# Patient Record
Sex: FEMALE | Race: WHITE | Employment: OTHER | URBAN - NONMETROPOLITAN AREA
[De-identification: names, ages, dates, MRNs, and addresses within clinical notes are randomized per-mention and may not be internally consistent; named-entity substitution may affect disease eponyms.]

---

## 2017-04-02 DIAGNOSIS — J44.9 CHRONIC OBSTRUCTIVE PULMONARY DISEASE, UNSPECIFIED COPD TYPE (HCC): ICD-10-CM

## 2017-08-07 ENCOUNTER — TELEPHONE (OUTPATIENT)
Dept: PRIMARY CARE CLINIC | Age: 82
End: 2017-08-07

## 2017-09-07 RX ORDER — DIGOXIN 125 MCG
125 TABLET ORAL DAILY
Qty: 90 TABLET | Refills: 3 | Status: SHIPPED | OUTPATIENT
Start: 2017-09-07 | End: 2017-11-13 | Stop reason: SDUPTHER

## 2017-11-13 ENCOUNTER — OFFICE VISIT (OUTPATIENT)
Dept: PRIMARY CARE CLINIC | Age: 82
End: 2017-11-13
Payer: MEDICARE

## 2017-11-13 VITALS
WEIGHT: 97 LBS | HEART RATE: 68 BPM | OXYGEN SATURATION: 95 % | BODY MASS INDEX: 16.56 KG/M2 | TEMPERATURE: 98.4 F | SYSTOLIC BLOOD PRESSURE: 98 MMHG | HEIGHT: 64 IN | DIASTOLIC BLOOD PRESSURE: 74 MMHG

## 2017-11-13 DIAGNOSIS — I48.91 ATRIAL FIBRILLATION, UNSPECIFIED TYPE (HCC): Primary | ICD-10-CM

## 2017-11-13 DIAGNOSIS — F03.90 DEMENTIA WITHOUT BEHAVIORAL DISTURBANCE, UNSPECIFIED DEMENTIA TYPE: ICD-10-CM

## 2017-11-13 DIAGNOSIS — E78.2 MIXED HYPERLIPIDEMIA: ICD-10-CM

## 2017-11-13 DIAGNOSIS — I50.9 CHRONIC CONGESTIVE HEART FAILURE, UNSPECIFIED CONGESTIVE HEART FAILURE TYPE: ICD-10-CM

## 2017-11-13 DIAGNOSIS — Z23 NEED FOR INFLUENZA VACCINATION: ICD-10-CM

## 2017-11-13 PROCEDURE — G8484 FLU IMMUNIZE NO ADMIN: HCPCS | Performed by: NURSE PRACTITIONER

## 2017-11-13 PROCEDURE — 4040F PNEUMOC VAC/ADMIN/RCVD: CPT | Performed by: NURSE PRACTITIONER

## 2017-11-13 PROCEDURE — 90662 IIV NO PRSV INCREASED AG IM: CPT | Performed by: NURSE PRACTITIONER

## 2017-11-13 PROCEDURE — G8427 DOCREV CUR MEDS BY ELIG CLIN: HCPCS | Performed by: NURSE PRACTITIONER

## 2017-11-13 PROCEDURE — 1036F TOBACCO NON-USER: CPT | Performed by: NURSE PRACTITIONER

## 2017-11-13 PROCEDURE — 1123F ACP DISCUSS/DSCN MKR DOCD: CPT | Performed by: NURSE PRACTITIONER

## 2017-11-13 PROCEDURE — G8599 NO ASA/ANTIPLAT THER USE RNG: HCPCS | Performed by: NURSE PRACTITIONER

## 2017-11-13 PROCEDURE — G0008 ADMIN INFLUENZA VIRUS VAC: HCPCS | Performed by: NURSE PRACTITIONER

## 2017-11-13 PROCEDURE — 1090F PRES/ABSN URINE INCON ASSESS: CPT | Performed by: NURSE PRACTITIONER

## 2017-11-13 PROCEDURE — G8419 CALC BMI OUT NRM PARAM NOF/U: HCPCS | Performed by: NURSE PRACTITIONER

## 2017-11-13 PROCEDURE — 99214 OFFICE O/P EST MOD 30 MIN: CPT | Performed by: NURSE PRACTITIONER

## 2017-11-13 RX ORDER — DIGOXIN 125 MCG
125 TABLET ORAL DAILY
Qty: 90 TABLET | Refills: 3 | Status: SHIPPED | OUTPATIENT
Start: 2017-11-13 | End: 2018-01-01

## 2017-11-13 ASSESSMENT — ENCOUNTER SYMPTOMS
RHINORRHEA: 0
ABDOMINAL PAIN: 0
COUGH: 0
VOMITING: 0
SORE THROAT: 0
DIARRHEA: 0
SINUS PRESSURE: 0
CONSTIPATION: 0
SHORTNESS OF BREATH: 0
TROUBLE SWALLOWING: 0
NAUSEA: 0

## 2017-11-13 NOTE — PROGRESS NOTES
History:   Diagnosis Date    Allergic rhinitis     Anemia     Anxiety     Arthritis     Asthma     Atrial fibrillation (HCC)     Atrial fibrillation (HCC)     CAD (coronary artery disease)     CHF (congestive heart failure) (HCC)     Chronic back pain     Chronic pain     COPD (chronic obstructive pulmonary disease) (HCC)     COPD (chronic obstructive pulmonary disease) (HCC)     Depression     DJD (degenerative joint disease)     KNEES    Dyspnea on exertion     Fatigue     GERD (gastroesophageal reflux disease)     GI bleed     Hyperlipidemia     Hypertension     Myalgia     Neck pain     Palpitations     Pedal edema     Left ankle    Psoriasis     SOB (shortness of breath)     Tendonitis        Past Surgical History:   Procedure Laterality Date    CHOLECYSTECTOMY      DIAGNOSTIC CARDIAC CATH LAB PROCEDURE  1/25/02       Social History   Substance Use Topics    Smoking status: Former Smoker     Types: Cigarettes     Quit date: 12/17/1989    Smokeless tobacco: Never Used    Alcohol use No       Review of Systems   Constitutional: Negative for activity change, appetite change, fatigue, fever and unexpected weight change. HENT: Negative for congestion, hearing loss, rhinorrhea, sinus pressure, sore throat and trouble swallowing. Eyes: Negative for visual disturbance. Respiratory: Negative for cough and shortness of breath. Cardiovascular: Negative for chest pain, palpitations and leg swelling. Gastrointestinal: Negative for abdominal pain, constipation, diarrhea, nausea and vomiting. Endocrine: Negative for cold intolerance and heat intolerance. Genitourinary: Negative for flank pain, menstrual problem, pelvic pain, urgency and vaginal discharge. Musculoskeletal: Positive for arthralgias (chronic back pain ) and gait problem (uses walker). Skin: Negative for rash. Neurological: Negative for headaches. Psychiatric/Behavioral: Positive for confusion.  Negative for dysphoric mood and sleep disturbance. The patient is not nervous/anxious. Physical Exam   Constitutional: She is oriented to person, place, and time. She appears well-developed and well-nourished. HENT:   Head: Normocephalic and atraumatic. Right Ear: External ear normal.   Left Ear: External ear normal.   Nose: Nose normal.   Mouth/Throat: Oropharynx is clear and moist and mucous membranes are normal.   Heavy cerumen buildup in external ear canal.    Eyes: Conjunctivae are normal. Pupils are equal, round, and reactive to light. No scleral icterus. Neck: Normal range of motion. Neck supple. Cardiovascular: Normal rate, regular rhythm, normal heart sounds and intact distal pulses. No murmur heard. Pulmonary/Chest: Effort normal and breath sounds normal. No respiratory distress. Abdominal: Soft. Bowel sounds are normal. She exhibits no distension. There is no tenderness. Musculoskeletal: Normal range of motion. She exhibits no edema. Uses walker     Neurological: She is alert and oriented to person, place, and time. GCS eye subscore is 4. GCS verbal subscore is 5. GCS motor subscore is 6. Skin: Skin is warm, dry and intact. No lesion and no rash noted. Psychiatric: She has a normal mood and affect. Her speech is normal and behavior is normal. Judgment normal. Cognition and memory are impaired. She exhibits abnormal recent memory. Vitals reviewed. ASSESSMENT      ICD-10-CM ICD-9-CM    1. Atrial fibrillation, unspecified type (HCC) I48.91 427.31 digoxin (LANOXIN) 125 MCG tablet      T4, Free      TSH without Reflex   2. Mixed hyperlipidemia E78.2 272.2 CBC Auto Differential      Comprehensive Metabolic Panel      Lipid Panel   3. Chronic congestive heart failure, unspecified congestive heart failure type (HCC) I50.9 428.0 digoxin (LANOXIN) 125 MCG tablet      Digoxin Level      Internal Referral to Home Health   4.  Need for influenza vaccination Z23 V04.81 INFLUENZA, HIGH DOSE, 65 YRS to complete a self tracking handout on n/a and instructed them to bring it with them to her next appointment. Discussed use, benefit, and side effects of prescribed medications. Barriers to medication compliance addressed. All patient questions answered. Pt voiced understanding.      SANTOS Morillo

## 2017-11-14 ENCOUNTER — TELEPHONE (OUTPATIENT)
Dept: PRIMARY CARE CLINIC | Age: 82
End: 2017-11-14

## 2017-11-14 NOTE — TELEPHONE ENCOUNTER
Iliana with [facility] called, wanting to  Know what agency pt wanted to go to.  I told her [facility], hence the internal referral. Had to fax pts referral, OV, demo, and med list to [phone]

## 2017-11-15 ENCOUNTER — TELEPHONE (OUTPATIENT)
Dept: PRIMARY CARE CLINIC | Age: 82
End: 2017-11-15

## 2017-11-15 NOTE — TELEPHONE ENCOUNTER
Anitha Papito with [address]  went to do admission and pt and spouse was too confused to admit her. They will try again tomorrow after 4 when dtr can be there. They also need a  order as pt is not bathing and other hygiene issues. And they want to try and do PT and OT.

## 2017-11-16 ENCOUNTER — TELEPHONE (OUTPATIENT)
Dept: PRIMARY CARE CLINIC | Age: 82
End: 2017-11-16

## 2017-11-16 NOTE — TELEPHONE ENCOUNTER
Christopher Severino called, she went to see pt today and pts family has wanted to wait on [facility] for right now. They have cancelled it.

## 2017-11-22 ENCOUNTER — TELEPHONE (OUTPATIENT)
Dept: PRIMARY CARE CLINIC | Age: 82
End: 2017-11-22

## 2017-11-23 NOTE — TELEPHONE ENCOUNTER
That I believe, would be arranged outside of our office. I do not know that that is something that we can order, that Medicare or insurance will provide.   If I am incorrect, please let me know

## 2017-11-27 NOTE — TELEPHONE ENCOUNTER
LM earlier for Ruchi Branch with [facility]. Also LM with pts daughter just now to call us back.

## 2018-01-01 ENCOUNTER — TELEPHONE (OUTPATIENT)
Dept: PRIMARY CARE CLINIC | Age: 83
End: 2018-01-01

## 2018-01-01 ENCOUNTER — CARE COORDINATION (OUTPATIENT)
Dept: CARE COORDINATION | Age: 83
End: 2018-01-01

## 2018-01-01 ENCOUNTER — HOSPITAL ENCOUNTER (OUTPATIENT)
Dept: WOUND CARE | Age: 83
Discharge: HOME | End: 2018-05-18
Payer: MEDICARE

## 2018-01-01 ENCOUNTER — APPOINTMENT (OUTPATIENT)
Dept: CT IMAGING | Age: 83
End: 2018-01-01
Payer: MEDICARE

## 2018-01-01 ENCOUNTER — OFFICE VISIT (OUTPATIENT)
Dept: PRIMARY CARE CLINIC | Age: 83
End: 2018-01-01
Payer: MEDICARE

## 2018-01-01 ENCOUNTER — HOSPITAL ENCOUNTER (OUTPATIENT)
Dept: WOUND CARE | Age: 83
Discharge: HOME | End: 2018-06-04

## 2018-01-01 ENCOUNTER — HOSPITAL ENCOUNTER (EMERGENCY)
Age: 83
Discharge: HOME | End: 2018-12-04
Attending: EMERGENCY MEDICINE
Payer: MEDICARE

## 2018-01-01 VITALS
WEIGHT: 98 LBS | OXYGEN SATURATION: 95 % | DIASTOLIC BLOOD PRESSURE: 70 MMHG | SYSTOLIC BLOOD PRESSURE: 100 MMHG | TEMPERATURE: 98.9 F | HEART RATE: 74 BPM | BODY MASS INDEX: 16.73 KG/M2 | HEIGHT: 64 IN

## 2018-01-01 VITALS
BODY MASS INDEX: 30.82 KG/M2 | WEIGHT: 174 LBS | DIASTOLIC BLOOD PRESSURE: 51 MMHG | HEART RATE: 78 BPM | SYSTOLIC BLOOD PRESSURE: 98 MMHG | TEMPERATURE: 98.6 F | OXYGEN SATURATION: 92 %

## 2018-01-01 VITALS
BODY MASS INDEX: 18.28 KG/M2 | RESPIRATION RATE: 20 BRPM | HEART RATE: 66 BPM | DIASTOLIC BLOOD PRESSURE: 74 MMHG | WEIGHT: 103.2 LBS | SYSTOLIC BLOOD PRESSURE: 110 MMHG | TEMPERATURE: 97.8 F | OXYGEN SATURATION: 98 %

## 2018-01-01 VITALS
OXYGEN SATURATION: 96 % | WEIGHT: 150.38 LBS | SYSTOLIC BLOOD PRESSURE: 102 MMHG | TEMPERATURE: 97.9 F | BODY MASS INDEX: 26.64 KG/M2 | HEART RATE: 76 BPM | DIASTOLIC BLOOD PRESSURE: 67 MMHG

## 2018-01-01 VITALS
BODY MASS INDEX: 17.36 KG/M2 | DIASTOLIC BLOOD PRESSURE: 78 MMHG | RESPIRATION RATE: 18 BRPM | HEART RATE: 78 BPM | HEIGHT: 63 IN | TEMPERATURE: 98.2 F | SYSTOLIC BLOOD PRESSURE: 112 MMHG
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
## 2018-01-01 DIAGNOSIS — M79.604 LEG PAIN, ANTERIOR, RIGHT: ICD-10-CM

## 2018-01-01 DIAGNOSIS — F41.9 ANXIETY: ICD-10-CM

## 2018-01-01 DIAGNOSIS — F03.90 DEMENTIA WITHOUT BEHAVIORAL DISTURBANCE, UNSPECIFIED DEMENTIA TYPE: ICD-10-CM

## 2018-01-01 DIAGNOSIS — L03.115 CELLULITIS OF RIGHT LOWER EXTREMITY: ICD-10-CM

## 2018-01-01 DIAGNOSIS — T14.8XXA HEMATOMA: ICD-10-CM

## 2018-01-01 DIAGNOSIS — S81.801A WOUND OF RIGHT LOWER EXTREMITY, INITIAL ENCOUNTER: Primary | ICD-10-CM

## 2018-01-01 DIAGNOSIS — S09.90XA INJURY OF HEAD, INITIAL ENCOUNTER: Primary | ICD-10-CM

## 2018-01-01 DIAGNOSIS — I50.9 CONGESTIVE HEART FAILURE, UNSPECIFIED HF CHRONICITY, UNSPECIFIED HEART FAILURE TYPE (HCC): ICD-10-CM

## 2018-01-01 DIAGNOSIS — L98.499 SKIN ULCER, UNSPECIFIED ULCER STAGE (HCC): Primary | ICD-10-CM

## 2018-01-01 DIAGNOSIS — J44.9 CHRONIC OBSTRUCTIVE PULMONARY DISEASE, UNSPECIFIED COPD TYPE (HCC): ICD-10-CM

## 2018-01-01 DIAGNOSIS — J44.9 CHRONIC OBSTRUCTIVE PULMONARY DISEASE, UNSPECIFIED COPD TYPE (HCC): Primary | ICD-10-CM

## 2018-01-01 DIAGNOSIS — N30.01 ACUTE CYSTITIS WITH HEMATURIA: ICD-10-CM

## 2018-01-01 DIAGNOSIS — R53.83 FATIGUE, UNSPECIFIED TYPE: ICD-10-CM

## 2018-01-01 DIAGNOSIS — F03.91 DEMENTIA WITH BEHAVIORAL DISTURBANCE, UNSPECIFIED DEMENTIA TYPE: Primary | ICD-10-CM

## 2018-01-01 DIAGNOSIS — F02.818 LATE ONSET ALZHEIMER'S DISEASE WITH BEHAVIORAL DISTURBANCE (HCC): Primary | ICD-10-CM

## 2018-01-01 DIAGNOSIS — I48.20 CHRONIC ATRIAL FIBRILLATION (HCC): ICD-10-CM

## 2018-01-01 DIAGNOSIS — L97.912 NON-PRESSURE CHRONIC ULCER OF RIGHT LOWER LEG WITH FAT LAYER EXPOSED (HCC): ICD-10-CM

## 2018-01-01 DIAGNOSIS — I87.2 PERIPHERAL VENOUS INSUFFICIENCY: ICD-10-CM

## 2018-01-01 DIAGNOSIS — Z23 NEED FOR INFLUENZA VACCINATION: ICD-10-CM

## 2018-01-01 DIAGNOSIS — L97.812 SKIN ULCER OF RIGHT PRETIBIAL REGION WITH FAT LAYER EXPOSED (HCC): ICD-10-CM

## 2018-01-01 DIAGNOSIS — M79.89 PAIN AND SWELLING OF RIGHT LOWER LEG: ICD-10-CM

## 2018-01-01 DIAGNOSIS — G30.1 LATE ONSET ALZHEIMER'S DISEASE WITH BEHAVIORAL DISTURBANCE (HCC): Primary | ICD-10-CM

## 2018-01-01 DIAGNOSIS — F03.91 DEMENTIA WITH BEHAVIORAL DISTURBANCE, UNSPECIFIED DEMENTIA TYPE: ICD-10-CM

## 2018-01-01 DIAGNOSIS — L03.115 CELLULITIS OF LEG, RIGHT: ICD-10-CM

## 2018-01-01 DIAGNOSIS — K59.01 SLOW TRANSIT CONSTIPATION: ICD-10-CM

## 2018-01-01 DIAGNOSIS — S81.801A WOUND OF RIGHT LOWER EXTREMITY, INITIAL ENCOUNTER: ICD-10-CM

## 2018-01-01 DIAGNOSIS — F41.1 GAD (GENERALIZED ANXIETY DISORDER): Primary | ICD-10-CM

## 2018-01-01 DIAGNOSIS — M79.661 PAIN AND SWELLING OF RIGHT LOWER LEG: ICD-10-CM

## 2018-01-01 DIAGNOSIS — R06.09 DYSPNEA ON EXERTION: ICD-10-CM

## 2018-01-01 LAB
ALBUMIN SERPL-MCNC: 3.6 G/DL (ref 3.5–5.2)
ALP BLD-CCNC: 105 U/L (ref 35–104)
ALT SERPL-CCNC: 6 U/L (ref 5–33)
ANION GAP SERPL CALCULATED.3IONS-SCNC: 6 MMOL/L (ref 7–19)
AST SERPL-CCNC: 15 U/L (ref 5–32)
BACTERIA: ABNORMAL /HPF
BASOPHILS ABSOLUTE: 0.1 K/UL (ref 0–0.2)
BASOPHILS RELATIVE PERCENT: 1.1 % (ref 0–1)
BILIRUB SERPL-MCNC: 0.5 MG/DL (ref 0.2–1.2)
BILIRUBIN URINE: NEGATIVE
BLOOD, URINE: ABNORMAL
BUN BLDV-MCNC: 20 MG/DL (ref 8–23)
CALCIUM SERPL-MCNC: 9.6 MG/DL (ref 8.8–10.2)
CASTS: ABNORMAL /LPF
CHLORIDE BLD-SCNC: 100 MMOL/L (ref 98–111)
CHOLESTEROL, TOTAL: 210 MG/DL (ref 160–199)
CLARITY: ABNORMAL
CO2: 34 MMOL/L (ref 22–29)
COLOR: ABNORMAL
CREAT SERPL-MCNC: 0.7 MG/DL (ref 0.5–0.9)
DIGOXIN LEVEL: <0.3 NG/ML (ref 0.6–1.2)
EOSINOPHILS ABSOLUTE: 0.1 K/UL (ref 0–0.6)
EOSINOPHILS RELATIVE PERCENT: 3.1 % (ref 0–5)
GFR NON-AFRICAN AMERICAN: >60
GLUCOSE BLD-MCNC: 95 MG/DL (ref 74–109)
GLUCOSE URINE: NEGATIVE MG/DL
GRAM STAIN RESULT: ABNORMAL
HCT VFR BLD CALC: 43.9 % (ref 37–47)
HDLC SERPL-MCNC: 68 MG/DL (ref 65–121)
HEMOGLOBIN: 13.9 G/DL (ref 12–16)
KETONES, URINE: NEGATIVE MG/DL
LDL CHOLESTEROL CALCULATED: 124 MG/DL
LEUKOCYTE ESTERASE, URINE: ABNORMAL
LYMPHOCYTES ABSOLUTE: 1 K/UL (ref 1.1–4.5)
LYMPHOCYTES RELATIVE PERCENT: 21.8 % (ref 20–40)
MCH RBC QN AUTO: 31 PG (ref 27–31)
MCHC RBC AUTO-ENTMCNC: 31.7 G/DL (ref 33–37)
MCV RBC AUTO: 98 FL (ref 81–99)
MONOCYTES ABSOLUTE: 0.5 K/UL (ref 0–0.9)
MONOCYTES RELATIVE PERCENT: 10.2 % (ref 0–10)
MUCUS: ABNORMAL /LPF
NEUTROPHILS ABSOLUTE: 2.8 K/UL (ref 1.5–7.5)
NEUTROPHILS RELATIVE PERCENT: 63.4 % (ref 50–65)
NITRITE, URINE: NEGATIVE
ORGANISM: ABNORMAL
PDW BLD-RTO: 12 % (ref 11.5–14.5)
PH UA: 6
PLATELET # BLD: 271 K/UL (ref 130–400)
PMV BLD AUTO: 9.8 FL (ref 9.4–12.3)
POTASSIUM SERPL-SCNC: 4.5 MMOL/L (ref 3.5–5)
PROTEIN UA: ABNORMAL MG/DL
RBC # BLD: 4.48 M/UL (ref 4.2–5.4)
RBC UA: ABNORMAL /HPF (ref 0–2)
SODIUM BLD-SCNC: 140 MMOL/L (ref 136–145)
SPECIFIC GRAVITY UA: 1.03
T3 FREE: 2.1 PG/ML (ref 2–4.4)
T4 FREE: 1.1 NG/DL (ref 0.9–1.7)
TOTAL PROTEIN: 6.6 G/DL (ref 6.6–8.7)
TRIGL SERPL-MCNC: 92 MG/DL (ref 0–149)
TSH SERPL DL<=0.05 MIU/L-ACNC: 1.9 UIU/ML (ref 0.27–4.2)
URINE CULTURE, ROUTINE: NORMAL
URINE REFLEX TO CULTURE: YES
UROBILINOGEN, URINE: 0.2 E.U./DL
WBC # BLD: 4.5 K/UL (ref 4.8–10.8)
WBC UA: ABNORMAL /HPF (ref 0–5)
WOUND/ABSCESS: ABNORMAL
WOUND/ABSCESS: ABNORMAL

## 2018-01-01 PROCEDURE — 1036F TOBACCO NON-USER: CPT | Performed by: NURSE PRACTITIONER

## 2018-01-01 PROCEDURE — 90662 IIV NO PRSV INCREASED AG IM: CPT | Performed by: NURSE PRACTITIONER

## 2018-01-01 PROCEDURE — 81001 URINALYSIS AUTO W/SCOPE: CPT

## 2018-01-01 PROCEDURE — 1101F PT FALLS ASSESS-DOCD LE1/YR: CPT | Performed by: NURSE PRACTITIONER

## 2018-01-01 PROCEDURE — G8427 DOCREV CUR MEDS BY ELIG CLIN: HCPCS | Performed by: NURSE PRACTITIONER

## 2018-01-01 PROCEDURE — G8599 NO ASA/ANTIPLAT THER USE RNG: HCPCS | Performed by: NURSE PRACTITIONER

## 2018-01-01 PROCEDURE — 3023F SPIROM DOC REV: CPT | Performed by: NURSE PRACTITIONER

## 2018-01-01 PROCEDURE — 99284 EMERGENCY DEPT VISIT MOD MDM: CPT | Performed by: EMERGENCY MEDICINE

## 2018-01-01 PROCEDURE — 99213 OFFICE O/P EST LOW 20 MIN: CPT | Performed by: NURSE PRACTITIONER

## 2018-01-01 PROCEDURE — 70450 CT HEAD/BRAIN W/O DYE: CPT

## 2018-01-01 PROCEDURE — 1123F ACP DISCUSS/DSCN MKR DOCD: CPT | Performed by: NURSE PRACTITIONER

## 2018-01-01 PROCEDURE — G8417 CALC BMI ABV UP PARAM F/U: HCPCS | Performed by: NURSE PRACTITIONER

## 2018-01-01 PROCEDURE — 99214 OFFICE O/P EST MOD 30 MIN: CPT | Performed by: NURSE PRACTITIONER

## 2018-01-01 PROCEDURE — G8926 SPIRO NO PERF OR DOC: HCPCS | Performed by: NURSE PRACTITIONER

## 2018-01-01 PROCEDURE — 1090F PRES/ABSN URINE INCON ASSESS: CPT | Performed by: NURSE PRACTITIONER

## 2018-01-01 PROCEDURE — G8419 CALC BMI OUT NRM PARAM NOF/U: HCPCS | Performed by: NURSE PRACTITIONER

## 2018-01-01 PROCEDURE — 72128 CT CHEST SPINE W/O DYE: CPT

## 2018-01-01 PROCEDURE — 99214 OFFICE O/P EST MOD 30 MIN: CPT

## 2018-01-01 PROCEDURE — 6370000000 HC RX 637 (ALT 250 FOR IP): Performed by: EMERGENCY MEDICINE

## 2018-01-01 PROCEDURE — 72125 CT NECK SPINE W/O DYE: CPT

## 2018-01-01 PROCEDURE — 4040F PNEUMOC VAC/ADMIN/RCVD: CPT | Performed by: NURSE PRACTITIONER

## 2018-01-01 PROCEDURE — 87086 URINE CULTURE/COLONY COUNT: CPT

## 2018-01-01 PROCEDURE — 72131 CT LUMBAR SPINE W/O DYE: CPT

## 2018-01-01 PROCEDURE — G0008 ADMIN INFLUENZA VIRUS VAC: HCPCS | Performed by: NURSE PRACTITIONER

## 2018-01-01 PROCEDURE — G8482 FLU IMMUNIZE ORDER/ADMIN: HCPCS | Performed by: NURSE PRACTITIONER

## 2018-01-01 PROCEDURE — 99284 EMERGENCY DEPT VISIT MOD MDM: CPT

## 2018-01-01 RX ORDER — SERTRALINE HYDROCHLORIDE 25 MG/1
25 TABLET, FILM COATED ORAL DAILY
Qty: 30 TABLET | Refills: 1 | Status: SHIPPED | OUTPATIENT
Start: 2018-01-01 | End: 2018-01-01 | Stop reason: SDUPTHER

## 2018-01-01 RX ORDER — DIAZEPAM 5 MG/1
2.5 TABLET ORAL 2 TIMES DAILY PRN
Qty: 30 TABLET | Refills: 0 | Status: SHIPPED | OUTPATIENT
Start: 2018-01-01 | End: 2018-01-01

## 2018-01-01 RX ORDER — NITROFURANTOIN 25; 75 MG/1; MG/1
100 CAPSULE ORAL 2 TIMES DAILY
Qty: 20 CAPSULE | Refills: 0 | Status: SHIPPED | OUTPATIENT
Start: 2018-01-01 | End: 2018-01-01

## 2018-01-01 RX ORDER — CLINDAMYCIN HYDROCHLORIDE 300 MG/1
300 CAPSULE ORAL 3 TIMES DAILY
Qty: 30 CAPSULE | Refills: 0 | Status: SHIPPED | OUTPATIENT
Start: 2018-01-01 | End: 2018-01-01

## 2018-01-01 RX ORDER — DOCUSATE SODIUM 100 MG/1
100 CAPSULE, LIQUID FILLED ORAL 2 TIMES DAILY
COMMUNITY

## 2018-01-01 RX ORDER — NITROFURANTOIN 25; 75 MG/1; MG/1
100 CAPSULE ORAL ONCE
Status: COMPLETED | OUTPATIENT
Start: 2018-01-01 | End: 2018-01-01

## 2018-01-01 RX ORDER — LEVOFLOXACIN 500 MG/1
500 TABLET, FILM COATED ORAL DAILY
Qty: 7 TABLET | Refills: 0 | Status: SHIPPED | OUTPATIENT
Start: 2018-01-01 | End: 2018-01-01

## 2018-01-01 RX ADMIN — NITROFURANTOIN (MONOHYDRATE/MACROCRYSTALS) 100 MG: 75; 25 CAPSULE ORAL at 17:00

## 2018-01-01 ASSESSMENT — ENCOUNTER SYMPTOMS
CONSTIPATION: 0
SORE THROAT: 0
TROUBLE SWALLOWING: 0
SHORTNESS OF BREATH: 0
RHINORRHEA: 0
COUGH: 0
VOMITING: 0
CONSTIPATION: 0
DIARRHEA: 0
TROUBLE SWALLOWING: 0
SHORTNESS OF BREATH: 0
ABDOMINAL PAIN: 0
ABDOMINAL PAIN: 0
SHORTNESS OF BREATH: 0
SORE THROAT: 0
RHINORRHEA: 0
NAUSEA: 0
VOMITING: 0
ABDOMINAL PAIN: 0
COUGH: 0
VOMITING: 0
SINUS PRESSURE: 0
NAUSEA: 0
SINUS PRESSURE: 0
SORE THROAT: 0
NAUSEA: 0
DIARRHEA: 0
RHINORRHEA: 0
CONSTIPATION: 0
COUGH: 0
TROUBLE SWALLOWING: 0
SINUS PRESSURE: 0
DIARRHEA: 0

## 2018-05-17 PROBLEM — S81.809A NON-HEALING WOUND OF LOWER EXTREMITY, INITIAL ENCOUNTER: Status: ACTIVE | Noted: 2018-01-01

## 2018-05-17 PROBLEM — S81.801A OPEN WOUND OF RIGHT LOWER EXTREMITY: Status: ACTIVE | Noted: 2018-01-01

## 2018-05-18 PROBLEM — I87.2 PERIPHERAL VENOUS INSUFFICIENCY: Status: ACTIVE | Noted: 2018-01-01

## 2018-05-18 PROBLEM — L03.115 CELLULITIS OF RIGHT LOWER EXTREMITY: Status: ACTIVE | Noted: 2018-01-01

## 2018-05-18 PROBLEM — L97.812 SKIN ULCER OF RIGHT PRETIBIAL REGION WITH FAT LAYER EXPOSED (HCC): Status: ACTIVE | Noted: 2018-01-01

## 2018-07-17 NOTE — TELEPHONE ENCOUNTER
Is getting Valium from . It is not helping at all. She is having panic attacks over little things, like a crumb on the floor, nit picking. [facility] states she might need an anti-depressant. The Valium too was given for her sciatic nerve. Tristen Soto states that she complains so much that no one wants to be around)    Kem Kelly is in her chart.

## 2018-08-27 NOTE — PROGRESS NOTES
After obtaining consent from Ko Muniz, gave patient fluzone high dose injection in Right deltoid, patient tolerated well. Medication was not supplied by the patient.

## 2018-08-27 NOTE — PROGRESS NOTES
[address]  [place], [address] [place] [address] Rd  Phone [phone]   Fax [phone]      OFFICE VISIT: 8/27/2018    Ariadna Gallardo is a 80 y.o. female who presents today for her medical conditions/complaints as noted below. Ariadna Gallardo is c/o of Anxiety (here for follow up on anxiety and depression. not working. would like to talk about medication to help with more of panic attacks. ) and Flu Vaccine (requesting to get flu shot while they are here)        HPI:     HPI  Here for f/u on moods   Anxiety  She Is anxious about everything. Her memory is so bad and she gets angry easily. Started zoloft and daughter states that it didn't help with anything.      Past Medical History:   Diagnosis Date    Allergic rhinitis     Anemia     Anxiety     Arthritis     Asthma     Atrial fibrillation (HCC)     Atrial fibrillation (HCC)     CAD (coronary artery disease)     CHF (congestive heart failure) (HCC)     Chronic back pain     Chronic pain     COPD (chronic obstructive pulmonary disease) (HCC)     COPD (chronic obstructive pulmonary disease) (HCC)     Depression     DJD (degenerative joint disease)     KNEES    Dyspnea on exertion     Fatigue     GERD (gastroesophageal reflux disease)     GI bleed     Hyperlipidemia     Hypertension     Myalgia     Neck pain     Palpitations     Pedal edema     Left ankle    Psoriasis     SOB (shortness of breath)     Tendonitis       Past Surgical History:   Procedure Laterality Date    CHOLECYSTECTOMY      DIAGNOSTIC CARDIAC CATH LAB PROCEDURE  1/25/02       Family History   Problem Relation Age of Onset    Coronary Art Dis Other         Family in general    Hypertension Other         Family in general    Cancer Other         Family in general    Diabetes Other         Family in general       Social History   Substance Use Topics    Smoking status: Former Smoker     Types: Cigarettes     Quit date: 12/17/1989    Smokeless tobacco: Never Used   Esequiel Goltz Cardiovascular: Normal rate, regular rhythm, normal heart sounds and intact distal pulses. No murmur heard. Pulmonary/Chest: Effort normal and breath sounds normal.   Abdominal: Soft. Bowel sounds are normal. She exhibits no distension. There is no tenderness. There is no rebound. Musculoskeletal: Normal range of motion. She exhibits no edema. Neurological: She is alert. She is disoriented. In wheelchair   Skin: Skin is warm, dry and intact. No lesion and no rash noted. Psychiatric: She has a normal mood and affect. Her speech is normal and behavior is normal. Judgment and thought content normal.   Vitals reviewed. BP (!) 98/51   Pulse 78   Temp 98.6 °F (37 °C) (Temporal)   Wt 174 lb (78.9 kg) Comment: in wheelchair  SpO2 92%   BMI 30.82 kg/m²     Assessment:        ICD-10-CM ICD-9-CM    1. KYRA (generalized anxiety disorder) F41.1 300.02 sertraline (ZOLOFT) 50 MG tablet   2. Dementia with behavioral disturbance, unspecified dementia type F03.91 294.21 sertraline (ZOLOFT) 50 MG tablet   3. Need for influenza vaccination Z23 V04.81        Plan:      Return in about 3 months (around 11/27/2018). No orders of the defined types were placed in this encounter. Orders Placed This Encounter   Medications    sertraline (ZOLOFT) 50 MG tablet     Sig: Take 1 tablet by mouth daily     Dispense:  90 tablet     Refill:  1         Discussed use, benefit, and side effects of prescribed medications. All patient questions answered. Pt voiced understanding. Reviewed health maintenance. .  Patient agreed with treatment plan. Follow up as directed. Quality & Risk Score Accuracy    Last edited 08/27/18 12:51 CDT by SANTOS West            There are no Patient Instructions on file for this visit.       Electronically signed by SANTOS West on 8/27/2018 at 12:52 PM

## 2018-10-17 NOTE — TELEPHONE ENCOUNTER
pts daughter called, wants to know about med for urinary incontinence. She states pt will not wear her depends and the sitters are fixing to quit bc they don't want to keep cleaning her up.

## 2018-10-17 NOTE — TELEPHONE ENCOUNTER
The medicine for urinary incontinence will possibly only make her more confused and probably will not work. Pt most likely having accidents because of her alzheimers and not thinking about having to go.

## 2018-10-29 NOTE — TELEPHONE ENCOUNTER
JOHAN PRESLEY [facility] was admitted this weekend. Nursing visit 1x a week for 1 week, then 2x week for 1 week, then 1x a week for 3 weeks. pts daughter wants a  consult as well please.

## 2018-11-12 NOTE — TELEPHONE ENCOUNTER
pts daughter called wanting to know what she would need to do about getting pt into a nursing facility

## 2018-11-14 NOTE — TELEPHONE ENCOUNTER
Eryn-can you please help pts daughter with options per Silvana Harmon? Pt is in [facility], but they said she has no reason to go to the nursing home. Sabino Clements has feel and hurt her knee, pts spouse is in [facility] as well. Wants to know if you  Have any other ideas on getting her into the nursing home. Vero Lamb, APRN   to [facility] [address] Clinical Staff        11/12/18 12:53 PM   Note      Most require 2 day hospital stay for admission. Some will do direct admit but I am not sure which ones would. She can not be admitted if she doesn't have a medical illness meeting requirement for hospital stay. Unfortunately, Dementia is not an admitable diagnosis. Is  still seeing patient? If so, discuss with them.    I have ask our  in the office for additional resources

## 2018-11-16 NOTE — TELEPHONE ENCOUNTER
I'll see Valium on her medication list.  Please see if they could try giving her a Valium before bedtime to see if this will relax her and help her sleep.

## 2018-11-21 NOTE — PROGRESS NOTES
[facility] Maintenance   Topic Date Due    DTaP/Tdap/Td vaccine (1 - Tdap) 11/01/1946    Shingles Vaccine (1 of 2 - 2 Dose Series) 11/01/1977    Pneumococcal low/med risk (2 of 2 - PPSV23) 10/10/2017    Flu vaccine  Completed        Subjective:     Review of Systems   Constitutional: Positive for appetite change (decreased). Negative for activity change, fatigue, fever and unexpected weight change. HENT: Negative for congestion, hearing loss, rhinorrhea, sinus pressure, sore throat and trouble swallowing. Eyes: Negative for visual disturbance. Respiratory: Negative for cough and shortness of breath. Cardiovascular: Negative for chest pain, palpitations and leg swelling. Gastrointestinal: Negative for abdominal pain, constipation, diarrhea, nausea and vomiting. Endocrine: Negative for cold intolerance and heat intolerance. Genitourinary: Negative for flank pain, menstrual problem, pelvic pain, urgency and vaginal discharge. Musculoskeletal: Negative for arthralgias. Skin: Negative for rash. Neurological: Negative for headaches. Psychiatric/Behavioral: Positive for agitation, behavioral problems, confusion and sleep disturbance. Negative for dysphoric mood. The patient is not nervous/anxious. Objective:     Physical Exam   Constitutional: She is oriented to person, place, and time. She appears well-developed and well-nourished. HENT:   Head: Normocephalic and atraumatic. Right Ear: Tympanic membrane, external ear and ear canal normal.   Left Ear: Tympanic membrane, external ear and ear canal normal.   Nose: Nose normal.   Mouth/Throat: Oropharynx is clear and moist and mucous membranes are normal.   Eyes: Pupils are equal, round, and reactive to light. Conjunctivae are normal. No scleral icterus. Neck: Normal range of motion. Neck supple. Cardiovascular: Normal rate, regular rhythm, normal heart sounds and intact distal pulses. No murmur heard.   Pulmonary/Chest:

## 2018-12-04 NOTE — ED TRIAGE NOTES
Fall from [facility], hematoma to left forehead, per EMS no loss of consciousness.  Pt confused per norm

## 2018-12-04 NOTE — ED PROVIDER NOTES
[facility]  eMERGENCY dEPARTMENT eNCOUnter      Pt Name: Francisco Amaro  MRN: [id number]  [place] 11/1/1927  Date of evaluation: 12/4/2018  Provider: Abdoul Hernandez MD    CHIEF COMPLAINT       Chief Complaint   Patient presents with    Fall       HISTORY OF PRESENT ILLNESS   (Location/Symptom, Timing/Onset, Context/Setting, Quality, Duration, Modifying Factors, Severity)  Note limiting factors. Francisco Amaro is a 80 y.o. female who presents to the emergency department with Closed head injury after fall. He should is demented, history provided by family members. As per the family the patient fell out of her wheelchair from the nursing home. She fell onto her head. She did not lose consciousness. Patient is nonambulatory at baseline. While she was at the nurse's station being observed in the nursing home employees noted that the patient was complaining of back pain. As per the family she has chronic back pain. It is uncertain if this is new or worse. Patient transferred here for evaluation. Unable to obtain further history from the patient. Nursing Notes were reviewed.     REVIEW OF SYSTEMS    (2-9 systems for level 4,10 or more for level 5)     Review of Systems   Unable to perform ROS: Dementia        PAST MEDICAL HISTORY     Past Medical History:   Diagnosis Date    Allergic rhinitis     Alzheimer disease     Anemia     Anxiety     Arthritis     Asthma     Atrial fibrillation (HCC)     Atrial fibrillation (HCC)     CAD (coronary artery disease)     CHF (congestive heart failure) (HCC)     Chronic back pain     Chronic pain     COPD (chronic obstructive pulmonary disease) (HCC)     COPD (chronic obstructive pulmonary disease) (HCC)     Depression     DJD (degenerative joint disease)     KNEES    Dyspnea on exertion     Fatigue     GERD (gastroesophageal reflux disease)     GI bleed     Hyperlipidemia     Hypertension     Myalgia     Neck pain     Palpitations     Pedal LeFort fracture. Left frontal hematoma approximately 3 or 4 cm in diameter. Eyes: Pupils are equal, round, and reactive to light. EOM are normal. No scleral icterus. Neck: Normal range of motion. Neck supple. No tracheal deviation present. No cervical spinous process tenderness, step-offs, deformities   Cardiovascular: Normal rate, regular rhythm, normal heart sounds and intact distal pulses. Exam reveals no gallop and no friction rub. No murmur heard. Pulmonary/Chest: Effort normal and breath sounds normal. No respiratory distress. She has no wheezes. She has no rales. She exhibits no tenderness. Abdominal: Soft. She exhibits no distension and no mass. There is no tenderness. There is no rebound and no guarding. Musculoskeletal: Normal range of motion. She exhibits no edema, tenderness or deformity. No thoracic and lumbar spinous process tenderness, step-offs, deformities. Full range of motion in all 4 extremities. Neurological: She is alert. No cranial nerve deficit. She exhibits normal muscle tone. Coordination normal.   Cranial nerves II through XII intact, equal strength bilaterally in the upper and lower extremities, moving all extremities, following commands, alert and oriented to person   Skin: Skin is warm and dry. No rash noted. Psychiatric: She has a normal mood and affect. Her behavior is normal. Judgment and thought content normal.   Nursing note and vitals reviewed. DIAGNOSTIC RESULTS     EKG:All EKG's are interpreted by the Emergency Department Physician who either signs or Co-signs this chart in the absence of a cardiologist.    RADIOLOGY:   Non-plain film images such as CT, Ultrasound and MRI are read by theradiologist. Plain radiographic images are visualized and preliminarily interpreted by the emergency physician with the below findings:    CT Lumbar Spine WO Contrast   Final Result   1.  Superior plate endplate compression fracture the L1 vertebral body   with Central depression, suspect chronic, correlate with physical   findings. .   2. Advanced disc degeneration and spondylosis changes,, multilevel   described above. .   3. No CT evidence of acute osseous abnormality otherwise. Signed by Dr Cindy Vieira on 12/4/2018 3:40 PM      CT Thoracic Spine WO Contrast   Final Result   An age indeterminate superior endplate compression   deformity/fracture of vertebra L1. A mild retropulsion. It appears   chronic. Clinical correlation and further follow-up may be obtained. Thoracic spondylosis. No evidence of neural foraminal or spinal canal   stenosis. A mild anterolisthesis of C6 over C7 probably due to degenerative   arthropathy of the facet joints. The above finding are recorded on a digital voice clip in PACS. Signed by Dr Cass Rabago on 12/4/2018 3:35 PM      CT Cervical Spine WO Contrast   Final Result   Severe multilevel degenerative changes as detailed above   with no evidence of an acute fracture or other acute findings. Signed by Dr Kedar Villalpando. Tyson on 12/4/2018 3:34 PM      CT Head WO Contrast   Final Result   Impression:    1. Left forehead/frontal scalp soft tissue swelling. 2. No CT evidence of acute intracranial process. Signed by Dr Cindy Vieira on 12/4/2018 3:28 PM          LABS:  Labs Reviewed   URINE RT REFLEX TO CULTURE - Abnormal; Notable for the following:        Result Value    Clarity, UA CLOUDY (*)     Blood, Urine LARGE (*)     Protein, UA TRACE (*)     Leukocyte Esterase, Urine SMALL (*)     All other components within normal limits   MICROSCOPIC URINALYSIS - Abnormal; Notable for the following:     Casts 2-4 Hyaline (*)     Mucus, UA 3+ (*)     RBC, UA 16-20 (*)     All other components within normal limits   URINE CULTURE       All other labs were within normal range or not returned as of this dictation.     EMERGENCY DEPARTMENT COURSE and DIFFERENTIAL DIAGNOSIS/MDM:   Vitals:    Vitals:    12/04/18 1435 12/04/18 1440 12/04/18 1555   BP: 100/67  94/64   Pulse: 76  66   Resp: 16  20   Temp: 98.1 °F (36.7 °C)     TempSrc: Oral     SpO2: 97%     Weight:  103 lb 3.2 oz (46.8 kg)        MDM  Number of Diagnoses or Management Options  Acute cystitis with hematuria:   Fall, initial encounter:   Hematoma:   Injury of head, initial encounter:   Diagnosis management comments: 70-year-old demented female with a fall off a chair. We will evaluate for acute traumatic injury. Unlikely to be cardiac in origin. Disposition dependent on workup. ED Course  Patient seen and evaluated. Workup revealed no acute injury from the patient's fall. Old L1 fracture. Family aware of this. Possible UTI. So will start on Macrobid while cultures grow. Discharge home and PCP follow-up. Patient is well-appearing, stable for discharge and follow-up without fail with his/her medical doctor. I had a detailed discussion with the patient and/or guardian regarding the historical points, exam findings, and any diagnostic results supporting the discharge diagnosis. The patient was educated on care and need for follow-up. Strict return instructions including red flag signs and symptoms were discussed with the patient. Medications for discharge discussed, and adverse effects reviewed. Questions invited and answered. Patient shows understanding of the discharge information and agrees to follow-up. CONSULTS:  None    PROCEDURES:  Unless otherwise noted below, none     Procedures    FINAL IMPRESSION      1. Injury of head, initial encounter    2. Fall, initial encounter    3. Hematoma    4.  Acute cystitis with hematuria          DISPOSITION/PLAN   DISPOSITION Decision To Discharge 12/04/2018 04:29:57 PM      PATIENT REFERRED TO:  Rhett Gitelman, [address]  [phone]    Call in 1 day  For the next available appointment      DISCHARGE MEDICATIONS:  New Prescriptions    NITROFURANTOIN, MACROCRYSTAL-MONOHYDRATE, (MACROBID) 100 MG CAPSULE    Take 1

## 2019-02-26 ENCOUNTER — CARE COORDINATION (OUTPATIENT)
Dept: CARE COORDINATION | Age: 84
End: 2019-02-26